# Patient Record
Sex: FEMALE
[De-identification: names, ages, dates, MRNs, and addresses within clinical notes are randomized per-mention and may not be internally consistent; named-entity substitution may affect disease eponyms.]

---

## 2024-01-12 PROBLEM — Z00.00 ENCOUNTER FOR PREVENTIVE HEALTH EXAMINATION: Status: ACTIVE | Noted: 2024-01-12

## 2024-01-15 ENCOUNTER — APPOINTMENT (OUTPATIENT)
Dept: CARDIOLOGY | Facility: CLINIC | Age: 49
End: 2024-01-15
Payer: COMMERCIAL

## 2024-01-15 ENCOUNTER — NON-APPOINTMENT (OUTPATIENT)
Age: 49
End: 2024-01-15

## 2024-01-15 VITALS
HEIGHT: 63 IN | WEIGHT: 146 LBS | RESPIRATION RATE: 12 BRPM | HEART RATE: 64 BPM | SYSTOLIC BLOOD PRESSURE: 120 MMHG | BODY MASS INDEX: 25.87 KG/M2 | DIASTOLIC BLOOD PRESSURE: 82 MMHG

## 2024-01-15 DIAGNOSIS — E78.5 HYPERLIPIDEMIA, UNSPECIFIED: ICD-10-CM

## 2024-01-15 DIAGNOSIS — Z72.3 LACK OF PHYSICAL EXERCISE: ICD-10-CM

## 2024-01-15 DIAGNOSIS — Z82.3 FAMILY HISTORY OF STROKE: ICD-10-CM

## 2024-01-15 PROCEDURE — 99204 OFFICE O/P NEW MOD 45 MIN: CPT | Mod: 25

## 2024-01-15 PROCEDURE — 93000 ELECTROCARDIOGRAM COMPLETE: CPT

## 2024-01-15 NOTE — ASSESSMENT
[FreeTextEntry1] : ECG: Normal sinus rhythm at 64 with normal axis, intervals and no significant ST-T wave changes.   Laboratory data: ------------1/6/24 Chol-------229 HDL--------66 LDL-------147 Trig-------5-6  Impression: 48-year-old premenopausal female with hyperlipidemia and a family history of premature coronary disease. ECG is fairly unremarkable Examination within normal limits No active cardiac complaints at this time. Hyperlipidemia would seem to be requiring of attention.  We discussed the hyperlipidemia in some detail and the patient's wish to try to avoid pharmacotherapy if at all possible.  At this point, it is reasonable to give her a few months to see what if any adjustments she can make to her lifestyle in order to facilitate lowering. We discussed the role of aggressive diet and a regular exercise program. Repeat blood work may be performed in about 3 months with the understanding that if that point the LDL remains greater than 100 would advocate for some form of lipid-lowering therapy at that time.

## 2024-01-15 NOTE — PHYSICAL EXAM
[de-identified] :                    Well appearing and nourished with no obvious deformities or distress.  Eyes:  No conjunctival injection and no xanthelasmas. HEENT:  Normocephalic.Normal oral mucosa. No pallor or cyanosis Neck:  No jugular venous distension. with normal A and V wave forms. No palpable adenopathy. Cardiovascular:  Normal rate and rhythm with normal S1, S2 and a grade 1/6 systolic murmur. Distal arterial pulses are normal. No significant peripheral edema. Pulmonary:  Lungs are clear to auscultation and percussion. Normal respiratory pattern without any accessory muscle use Abdomen:  Soft, non-tender ; no palpable organomegaly or masses. Extremities: No digital clubbing, cyanosis or ischemic changes. Skin:  No skin lesions, rashes, ulcers or xanthomas. Psychiatric:  Alert and oriented to person, place and time. Appropriate mood and affect.

## 2024-01-15 NOTE — REASON FOR VISIT
[FreeTextEntry1] : 48-year-old premenopausal female presents here for cardiac evaluation with concerns about general overall cardiac risk. Patient, who has a family history of coronary artery disease is found to have hyperlipidemia.  She is not a smoker and has no history of hypertension or diabetes.  While not exercising regularly she is physically fairly active without any exertional discomfort or limitations. She denies PND, orthopnea edema syncope near syncope.  No history of heart murmur.  Her diet while not fastidious is reasonable and she keeps fairly active and has not experienced any significant weight gain.

## 2024-01-20 ENCOUNTER — APPOINTMENT (OUTPATIENT)
Dept: CARDIOLOGY | Facility: CLINIC | Age: 49
End: 2024-01-20

## 2024-01-22 ENCOUNTER — NON-APPOINTMENT (OUTPATIENT)
Age: 49
End: 2024-01-22

## 2024-02-26 ENCOUNTER — APPOINTMENT (OUTPATIENT)
Dept: CARDIOLOGY | Facility: CLINIC | Age: 49
End: 2024-02-26

## 2024-05-16 ENCOUNTER — APPOINTMENT (OUTPATIENT)
Dept: CARDIOLOGY | Facility: CLINIC | Age: 49
End: 2024-05-16